# Patient Record
Sex: FEMALE | Race: WHITE | Employment: STUDENT | URBAN - METROPOLITAN AREA
[De-identification: names, ages, dates, MRNs, and addresses within clinical notes are randomized per-mention and may not be internally consistent; named-entity substitution may affect disease eponyms.]

---
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
## 2024-01-02 ENCOUNTER — OFFICE VISIT (OUTPATIENT)
Dept: INTERNAL MEDICINE CLINIC | Facility: CLINIC | Age: 19
End: 2024-01-02

## 2024-01-02 VITALS
SYSTOLIC BLOOD PRESSURE: 119 MMHG | WEIGHT: 129 LBS | HEART RATE: 100 BPM | OXYGEN SATURATION: 100 % | HEIGHT: 70 IN | BODY MASS INDEX: 18.47 KG/M2 | DIASTOLIC BLOOD PRESSURE: 71 MMHG | TEMPERATURE: 98 F

## 2024-01-02 DIAGNOSIS — J02.0 STREP THROAT: Primary | ICD-10-CM

## 2024-01-02 LAB
CONTROL LINE PRESENT WITH A CLEAR BACKGROUND (YES/NO): YES YES/NO
KIT LOT #: 7926 NUMERIC
STREP GRP A CUL-SCR: POSITIVE

## 2024-01-02 PROCEDURE — 3078F DIAST BP <80 MM HG: CPT | Performed by: INTERNAL MEDICINE

## 2024-01-02 PROCEDURE — 3008F BODY MASS INDEX DOCD: CPT | Performed by: INTERNAL MEDICINE

## 2024-01-02 PROCEDURE — 3074F SYST BP LT 130 MM HG: CPT | Performed by: INTERNAL MEDICINE

## 2024-01-02 PROCEDURE — 99213 OFFICE O/P EST LOW 20 MIN: CPT | Performed by: INTERNAL MEDICINE

## 2024-01-02 PROCEDURE — 87880 STREP A ASSAY W/OPTIC: CPT | Performed by: INTERNAL MEDICINE

## 2024-01-02 RX ORDER — AMOXICILLIN 500 MG/1
500 CAPSULE ORAL 3 TIMES DAILY
Qty: 30 CAPSULE | Refills: 0 | Status: SHIPPED | OUTPATIENT
Start: 2024-01-02 | End: 2024-01-12

## 2024-01-02 NOTE — PROGRESS NOTES
Subjective:     Patient ID: Allie Lopez is a 18 year old female.    Sore Throat         History/Other:   She came today complaining of sore throat.  According to her symptoms are ongoing for almost 5 days.  She has very painful swallowing.  She denies any fever or chills.  She denies any cough or congestion.  She denies any Knysak breath  Review of Systems   Constitutional: Negative.    HENT:  Positive for sore throat.    Respiratory: Negative.     Cardiovascular: Negative.    Gastrointestinal: Negative.    Genitourinary: Negative.    Musculoskeletal: Negative.    Neurological: Negative.    Hematological:  Negative for adenopathy.   Psychiatric/Behavioral: Negative.       No current outpatient medications on file.     Allergies:No Known Allergies    History reviewed. No pertinent past medical history.   History reviewed. No pertinent surgical history.   Family History   Problem Relation Age of Onset    Asthma Father     Hypertension Maternal Uncle     Lipids Maternal Uncle     Allergies Neg     Diabetes Neg     Cancer Neg     Heart Disorder Neg     Obesity Neg     Psychiatric Neg       Social History:   Social History     Socioeconomic History    Marital status: Single   Tobacco Use    Smoking status: Never    Smokeless tobacco: Never   Vaping Use    Vaping Use: Some days   Substance and Sexual Activity    Alcohol use: Yes     Comment: occ    Drug use: Never   Other Topics Concern    Second-hand smoke exposure No    Alcohol/drug concerns No    Violence concerns No   Social History Narrative    6th grade,tennis,        Objective:   Physical Exam  Vitals and nursing note reviewed.   Constitutional:       Appearance: Normal appearance.   HENT:      Head: Normocephalic and atraumatic.   Cardiovascular:      Rate and Rhythm: Normal rate and regular rhythm.      Pulses: Normal pulses.      Heart sounds: Normal heart sounds.   Pulmonary:      Effort: Pulmonary effort is normal.      Breath sounds: Normal breath  sounds.   Abdominal:      Palpations: Abdomen is soft.   Musculoskeletal:         General: Normal range of motion.      Cervical back: Normal range of motion and neck supple.   Neurological:      Mental Status: She is alert. Mental status is at baseline.         Assessment & Plan:   No diagnosis found.  Strep throat  I will start her on antibiotic, advised to take with food, salt gargles can help, Tylenol alternating with ibuprofen as needed for fever/pain  No orders of the defined types were placed in this encounter.      Meds This Visit:  Requested Prescriptions      No prescriptions requested or ordered in this encounter       Imaging & Referrals:  None

## 2024-01-09 NOTE — TELEPHONE ENCOUNTER
100 mL NS bolus given, pt laid flat, and UFR slowed to 423 to increase BP.   Mom contacted   Pt was exposed to [address], Monday 6/29 (a friend who drives her to Tennis)     Yesterday, pt with a \"dull\" headache.    Pt is sleeping at time of call    No fever  No cough   No sore throat   Eating/drinking fine     Mom was advised to geeta

## 2024-11-27 ENCOUNTER — OFFICE VISIT (OUTPATIENT)
Dept: INTERNAL MEDICINE CLINIC | Facility: CLINIC | Age: 19
End: 2024-11-27
Payer: COMMERCIAL

## 2024-11-27 ENCOUNTER — LAB ENCOUNTER (OUTPATIENT)
Dept: LAB | Age: 19
End: 2024-11-27
Payer: COMMERCIAL

## 2024-11-27 VITALS
HEART RATE: 98 BPM | WEIGHT: 123 LBS | HEIGHT: 70 IN | SYSTOLIC BLOOD PRESSURE: 110 MMHG | BODY MASS INDEX: 17.61 KG/M2 | DIASTOLIC BLOOD PRESSURE: 83 MMHG | OXYGEN SATURATION: 98 %

## 2024-11-27 DIAGNOSIS — Z00.00 ROUTINE GENERAL MEDICAL EXAMINATION AT A HEALTH CARE FACILITY: Primary | ICD-10-CM

## 2024-11-27 DIAGNOSIS — F17.200 VAPING NICOTINE DEPENDENCE, NON-TOBACCO PRODUCT: ICD-10-CM

## 2024-11-27 DIAGNOSIS — Z00.00 ROUTINE GENERAL MEDICAL EXAMINATION AT A HEALTH CARE FACILITY: ICD-10-CM

## 2024-11-27 LAB
CHOLEST SERPL-MCNC: 170 MG/DL (ref ?–200)
DEPRECATED RDW RBC AUTO: 40.6 FL (ref 35.1–46.3)
ERYTHROCYTE [DISTWIDTH] IN BLOOD BY AUTOMATED COUNT: 12 % (ref 11–15)
FASTING PATIENT LIPID ANSWER: YES
HCT VFR BLD AUTO: 46.7 %
HDLC SERPL-MCNC: 94 MG/DL (ref 40–59)
HGB BLD-MCNC: 15.2 G/DL
LDLC SERPL CALC-MCNC: 66 MG/DL (ref ?–100)
MCH RBC QN AUTO: 29.7 PG (ref 26–34)
MCHC RBC AUTO-ENTMCNC: 32.5 G/DL (ref 31–37)
MCV RBC AUTO: 91.2 FL
NONHDLC SERPL-MCNC: 76 MG/DL (ref ?–130)
PLATELET # BLD AUTO: 327 10(3)UL (ref 150–450)
RBC # BLD AUTO: 5.12 X10(6)UL
TRIGL SERPL-MCNC: 49 MG/DL (ref 30–149)
TSI SER-ACNC: 2.39 UIU/ML (ref 0.48–4.17)
VLDLC SERPL CALC-MCNC: 7 MG/DL (ref 0–30)
WBC # BLD AUTO: 8.5 X10(3) UL (ref 4–11)

## 2024-11-27 PROCEDURE — 85027 COMPLETE CBC AUTOMATED: CPT

## 2024-11-27 PROCEDURE — 84443 ASSAY THYROID STIM HORMONE: CPT

## 2024-11-27 PROCEDURE — 80061 LIPID PANEL: CPT

## 2024-11-27 PROCEDURE — 36415 COLL VENOUS BLD VENIPUNCTURE: CPT

## 2024-11-27 RX ORDER — SPIRONOLACTONE 50 MG/1
50 TABLET, FILM COATED ORAL DAILY
COMMUNITY

## 2024-11-27 NOTE — PROGRESS NOTES
Subjective:   Allie Lopez is a 18 year old female who presents for Physical     Presents for annual physical     PMHx  Acne  Hyperhidrosis     She feels well overall  Going to college   Sexually active with her boyfriend, uses condoms   Gets regular periods     Sometimes takes propranolol and oxybutynin for her hyperhidrosis   Legs get mottled at times and she will take the propranolol if she has an event   Spironolactone for acne     Has been vaping for about 3-4 years and feels like it is affecting her health   Got strep throat 5 times this year and also had mono  Feels she is coughing up phlegm all the time   Denies sharing vape pens     Has tried to quit in the past and the longest she was able to quit was 8 days and still had cravings   Has tried nicotine gum, burns her mouth    Has also tried nicotine patches which helped a little, however she has eczema and hyperhidrosis so they would irritate or slide off her skin  Tried cutting cold turkey   Tried candy, water   Has trouble focusing to do school work without vaping  Vapes daily, during homework, or while drinking alcohol, or while around her friends who vape  When she is around her boyfriend she does not vape   Cravings are currently lower as she is home now   She wants to quit, is inquiring about medication therapy other than nicotine patches  Has never tried behavioral therapy for quitting    Rarely smokes marijuana   Drinks alcohol about once a week at school, maybe 2-3 drinks in a sitting     History/Other:    Chief Complaint Reviewed and Verified  No Further Nursing Notes to   Review  Tobacco Reviewed  Allergies Reviewed  Medications Reviewed  OB   Status Reviewed         Tobacco:  Social History     Tobacco Use   Smoking Status Never   Smokeless Tobacco Never     E-Cigarettes/Vaping       Questions Responses    E-Cigarette Use Current Some Day User          E-Cigarette/Vaping Substances       Questions Responses    Nicotine No    THC  No    CBD No    Flavoring No          E-Cigarette/Vaping Devices       Questions Responses    Disposable No    Pre-filled or Refillable Cartridge No    Refillable Tank No    Pre-filled Pod No           Tobacco cessation counseling for <3 minutes.      Current Outpatient Medications   Medication Sig Dispense Refill    spironolactone 50 MG Oral Tab Take 1 tablet (50 mg total) by mouth daily.       Review of Systems:  Review of Systems  10 point review of systems otherwise negative with the exception of HPI and assessment and plan    Objective:   /83   Pulse 98   Ht 5' 10\" (1.778 m)   Wt 123 lb (55.8 kg)   LMP 11/25/2024 (Approximate)   SpO2 98%   BMI 17.65 kg/m²  Estimated body mass index is 17.65 kg/m² as calculated from the following:    Height as of this encounter: 5' 10\" (1.778 m).    Weight as of this encounter: 123 lb (55.8 kg).  Physical Exam  Vitals reviewed.   Constitutional:       General: She is not in acute distress.     Appearance: Normal appearance. She is well-developed.   HENT:      Right Ear: Tympanic membrane normal.      Left Ear: Tympanic membrane normal.      Mouth/Throat:      Mouth: Mucous membranes are moist.      Pharynx: Oropharynx is clear.   Cardiovascular:      Rate and Rhythm: Normal rate and regular rhythm.      Heart sounds: Normal heart sounds.   Pulmonary:      Effort: Pulmonary effort is normal.      Breath sounds: Normal breath sounds.   Abdominal:      General: Bowel sounds are normal.      Palpations: Abdomen is soft.   Lymphadenopathy:      Cervical: No cervical adenopathy.   Skin:     General: Skin is warm and dry.   Neurological:      Mental Status: She is alert and oriented to person, place, and time.           Assessment & Plan:   1. Routine general medical examination at a health care facility (Primary)  -     CBC, Platelet; No Differential; Future; Expected date: 11/27/2024  -     TSH W Reflex To Free T4; Future; Expected date: 11/27/2024  -     Lipid Panel;  Future; Expected date: 11/27/2024  2. Vaping nicotine dependence, non-tobacco product  -     Behavioral Health Consultation  Discussed I would not order other medication therapies given her age and uncertain efficacy in this age group as well as concern for side effects   Will refer to behavioral therapy and psychiatry for assistance with quitting  Also provided information on free assistance programs   Encouraged to try to find a psychiatrist near her school through the student health clinic but also referred here - she will try to see them during Alpha break     ELHAM Lau, 11/27/2024, 9:32 AM

## 2024-11-27 NOTE — PATIENT INSTRUCTIONS
Resources to quit vaping:  [id number]   Smokefree Teen    Psychiatry - recommend you see a psychiatrist by your school  Talk to the health clinic to get a recommendation    Soft

## (undated) NOTE — LETTER
Name:  Sheela Almendarez Year:  Class: Student ID No.:   Address:  [address] [place] [address] Phone:  [phone] (home) [phone] (work) :  14year old   Name Relationship Lgl [address] Work Phone Home Phone Mobile Phone   1.  ST syndrome, short QT syndrome, Brugada syndrome, or catecholaminergic polymorphic ventricular tachycardia? 13. Does anyone in your family have a heart problem, pacemaker, or implanted defibrillator?      12. Has anyone in your family had unexplained faint 37. Do you have headaches with exercise? 38. Have you ever had numbness, tingling, or weakness in your arms or legs after being hit or falling? 39.Have you ever been unable to move your arms / legs after being hit /fall? 40.  Have you ever becom excavatum,      arachnodactyly, arm span > height, hyperlaxity, myopia, MVP, aortic insufficiency) Yes    Eyes/Ears/Nose/Throat:  Pupils equal    Hearing Yes    Lymph nodes Yes    Heart*  · Murmurs (auscultation standing, supine, +/- Valsalva)  · Location that I/our student will not use performance-enhancing substances as defined in the [facility] Performance-Enhancing Substance Testing Program Protocol.  We have reviewed the policy and understand that I/our student may be asked to submit to testing for the presen

## (undated) NOTE — LETTER
[place] [address] [address] of Child Health Examination       Student's Name  Bertha Soler Birth Date Title                           Date  01/22/18   Signature HEALTH HISTORY          TO BE COMPLETED AND SIGNED BY PARENT/GUARDIAN AND VERIFIED BY HEALTH CARE PROVIDER    ALLERGIES  (Food, drug, insect, other) MEDICATION  (List all prescribed or taken on a regular basis.)     Diagnosis of asthma?   Child wakes during DIABETES SCREENING  BMI>85% age/sex  No And any two of the following:  Family History No   Ethnic Minority  No          Signs of Insulin Resistance (hypertension, dyslipidemia, polycystic ovarian syndrome, acanthosis nigricans)    No           At Risk  No Quick-relief  medication (e.g. Short Acting Beta Antagonist): No          Controller medication (e.g. inhaled corticosteroid):   No Other   NEEDS/MODIFICATIONS required in the school setting  None DIETARY Needs/Restrictions     None   SPECIAL INSTR

## (undated) NOTE — LETTER
VACCINE ADMINISTRATION RECORD  PARENT / GUARDIAN APPROVAL  Date: 2019  Vaccine administered to: Cheyenne Vanegas     : 2005    MRN: [id number]    A copy of the appropriate Centers for Disease Control and Prevention Vaccine Information

## (undated) NOTE — LETTER
6/29/2018              Kelvin Capellan [address] [place] [address] [place] [address]       TO WHOM IT MAY CONCERN,      Rossi Chavez is a patient of mine. Her YOB: 2005.       Sincerely,                Leelee Noel

## (undated) NOTE — ED AVS SNAPSHOT
Parent/Legal Guardian Access to the Online [employer] Record of a Patient 15to 16Years Old  Return completed form by Secure email to [place] HIM/Medical Records Department: [email or web address].     Requirements and Procedures   Under [facility] MyChart ID and password with another person, that person may be able to view my or my child’s health information, and health information about someone who has authorized me as a MyChart proxy.    ·  I agree that it is my responsibility to select a confident Sign-Up Form and I agree to its terms.        Authorization Form     Please enter Patient’s information below:   Name (last, first, middle initial) __________________________________________   Gender  Male  Female    Last 4 Digits of Social Security Number Parent/Legal Guardian Signature                                  For Patient (1517 years of age)  I agree to allow my parent/legal guardian, named above, online access to my medical information currently available and that may become available as a result

## (undated) NOTE — LETTER
VACCINE ADMINISTRATION RECORD  PARENT / GUARDIAN APPROVAL  Date: 2021  Vaccine administered to: Breezy Lala     : 2005    MRN: [id number]    A copy of the appropriate Centers for Disease Control and Prevention Vaccine Information s

## (undated) NOTE — LETTER
Name:  Esthela Milligan Year:  7th Grade Class: Student ID No.:   Address:  [address] [place] [address] Phone:  [phone] (home) [phone] (work) :  14year old   Name Relationship Lgl [address] Work [employer] 12. Has anyone in your family had unexplained fainting, seizures, or near drowning? BONE AND JOINT QUESTIONS Yes No   17. Have you ever had an injury to a bone, muscle, ligament, or tendon that caused you to miss a practice or a game?      18. Have you /fall?     36. Have you ever become ill while exercising in the heat?     41. Do you get frequent muscle cramps when exercising? 42. Do you or someone in your family have sickle cell trait or disease? 43.  Have you ever had any problems with your ey Genitourinary (males only)* N/A    Skin:  HSV, lesions suggestive of MRSA, tinea corporis Yes    Neurologic* Yes    MUSCULOSKELETAL     Neck Yes    Back Yes    Shoulder/arm Yes    Elbow/forearm Yes    Wrist/hand/fingers Yes    Hip/thigh Yes    Knee Yes state series events or during the school day, and I/our student do/does hereby agree to submit to such testing and analysis by a certified laboratory.  We further understand and agree that the results of the performance-enhancing substance testing may be pr

## (undated) NOTE — LETTER
22          Jose Moreira  :  2005      To Whom It May Concern: This patient was seen in our office on 22. Please remain off of sports for the next 7 days. If this office may be of further assistance, please do not hesitate to contact us.       Sincerely,        Kye Thomas, DO

## (undated) NOTE — MR AVS SNAPSHOT
[address] [id number] [phone]               Thank you for choosing us for your health care visit with Andie Vernon MD.  We are glad to serve you and happy to provide you with this summar MyChart     Sign up for [employer] access for your child. [employer] access allows you to view health information for your child from their recent   visit, view other health information and more.   To sign up or find more information on getting   Proxy Acc In addition to 5, 4, 3, 2, 1 families can make small changes in their family routines to help everyone lead healthier active lives.  Try:  o Eating breakfast everyday  o Eating low-fat dairy products like yogurt, milk, and cheese  o Regularly eating meals t

## (undated) NOTE — LETTER
[facility] [employer] [facility] [employer] of Child Health Examination       Student's Name  [address] [place] [address], [address] [facility] [address] [address] [facility] [address] Title    APRN          Date  7/26/2021   Signature                                                                                                                                              Title                           Date    (If adding CARE PROVIDER    ALLERGIES  (Food, drug, insect, other)  Patient has no known allergies. MEDICATION  (List all prescribed or taken on a regular basis.)     Diagnosis of asthma?   Child wakes during the night coughing   Yes   No    Yes   No    Loss of functi Family History No    Ethnic Minority  No          Signs of Insulin Resistance (hypertension, dyslipidemia, polycystic ovarian syndrome, acanthosis nigricans)    No           At Risk  No   Lead Risk Questionnaire  Req'd for children 6 months thru 6 yrs alejandroro corticosteroid):   No Other   NEEDS/MODIFICATIONS required in the school setting  None DIETARY Needs/Restrictions     None   SPECIAL INSTRUCTIONS/DEVICES e.g. safety glasses, glass eye, chest protector for arrhythmia, pacemaker, prosthetic device, dental b

## (undated) NOTE — LETTER
[facility] [employer] [facility] [employer] of Child Health Examination       Student's Name  [address] [place] [address], [address] [facility] [address] [address] Signature                                                                                                                                    Title      MD                     Date  5/22/2019    Signature 12/21/2005  Sex  Female School   Grade Level/ID#  9th Grade   HEALTH HISTORY          TO BE COMPLETED AND SIGNED BY PARENT/GUARDIAN AND VERIFIED BY HEALTH CARE PROVIDER    ALLERGIES  (Food, drug, insect, other)  Patient has no known allergies.  MEDICATION appropriate personnel for health /educational purposes. Bone/Joint problem/injury/scoliosis?    Yes   No  Parent/Guardian Signature                                          Date     PHYSICAL EXAMINATION REQUIREMENTS    Entire section below to be completed SYSTEM REVIEW Normal Comments/Follow-up/Needs  Normal Comments/Follow-up/Needs   Skin Yes  Endocrine Yes    Ears Yes                      Screen result: Gastrointestinal Yes    Eyes Yes     Screen result:   Genito-Urinary Yes  LMP   Nose Yes  Neurological [address] [facility]  [phone]   Rev 11/15                                                                    Printed by the [employer]

## (undated) NOTE — LETTER
[facility] [employer] [facility] [employer] of Child Health Examination       Student's Name  [address] [place] [address], [address] [facility] [address] Title      MD                     Date  7/23/2020   Signature                                                                                                                                              Title                           Date VERIFIED BY HEALTH CARE PROVIDER    ALLERGIES  (Food, drug, insect, other)  Patient has no known allergies. MEDICATION  (List all prescribed or taken on a regular basis.)     Diagnosis of asthma?   Child wakes during the night coughing   Yes   No    Yes   N DIABETES SCREENING  BMI>85% age/sex  No And any two of the following:  Family History No    Ethnic Minority  No          Signs of Insulin Resistance (hypertension, dyslipidemia, polycystic ovarian syndrome, acanthosis nigricans)    No           At Risk  No Quick-relief  medication (e.g. Short Acting Beta Antagonist): No          Controller medication (e.g. inhaled corticosteroid):   No Other   NEEDS/MODIFICATIONS required in the school setting  None DIETARY Needs/Restrictions     None   SPECIAL INSTR

## (undated) NOTE — LETTER
[address], [facility]   Date:   9/13/2022     Name:   Ynes Griffin    YOB: 2005   MRN:   [id number]       [facility]? Bro the areas on your body where you feel the described sensations. Use the appropriate symbol. Breezy Smith the areas of radiation. Include all affected areas. Just to complete the picture, please draw in the face. ACHE:  ^ ^ ^   NUMBNESS:  0000   PINS & NEEDLES:  = = = =                              ^ ^ ^                       0000              = = = =                                    ^ ^ ^                       0000            = = = =      BURNING:  XXXX   STABBING: ////                  XXXX                ////                         XXXX          ////     Please bro the line below indicating your degree of pain right now  with 0 being no pain 10 being the worst pain possible.                                          0             1             2              3             4              5              6              7             8             9             10         Patient Signature:

## (undated) NOTE — LETTER
VACCINE ADMINISTRATION RECORD  PARENT / GUARDIAN APPROVAL  Date: 2020  Vaccine administered to: Bonnie Faustin     : 2005    MRN: [id number]    A copy of the appropriate Centers for Disease Control and Prevention Vaccine Information s

## (undated) NOTE — LETTER
Name:  Leander Drew Year:   Class: Student ID No.:   Address:  [address] [place] [address] Phone:  [phone] (home)  :  11year old   Name Relationship Lgl [address] Work Phone Home Phone Mobile Phone   1.  EYAD XIAO near drowning? BONE AND JOINT QUESTIONS Yes No   17. Have you ever had an injury to a bone, muscle, ligament, or tendon that caused you to miss a practice or a game? 18. Have you ever had any broken bones or dislocated joints?      19. Have you ever 41. Do you get frequent muscle cramps when exercising? 42. Do you or someone in your family have sickle cell trait or disease? 43. Have you ever had any problems with your eyes or vision? 44. Have you had any eye injuries? 45.  Do you wear of MRSA, tinea corporis Yes    Neurologic* Yes    MUSCULOSKELETAL     Neck Yes    Back Yes    Shoulder/arm Yes    Elbow/forearm Yes    Wrist/hand/fingers Yes    Hip/thigh Yes    Knee Yes    Leg/ankle Yes    Foot/toes Yes    Functional:  Duck-walk, single l submit to such testing and analysis by a certified laboratory.  We further understand and agree that the results of the performance-enhancing substance testing may be provided to certain individuals in my/our student’s high school as specified in the [address]

## (undated) NOTE — LETTER
Name:  Sheela Almendarez Year:   Class: Student ID No.:   Address:  [address] [place] [address] Phone:  [phone] (home)  :  12year old   Name Relationship Lgl [address] Work Phone Home Phone Mobile Phone   1.  EYAD XIAO 12. Has anyone in your family had unexplained fainting, seizures, or near drowning? BONE AND JOINT QUESTIONS Yes No   17. Have you ever had an injury to a bone, muscle, ligament, or tendon that caused you to miss a practice or a game?      18. Have you /fall?     36. Have you ever become ill while exercising in the heat?     41. Do you get frequent muscle cramps when exercising? 42. Do you or someone in your family have sickle cell trait or disease? 43.  Have you ever had any problems with your ey Lungs Yes    Abdomen Yes    Genitourinary (males only)* N/A    Skin:  HSV, lesions suggestive of MRSA, tinea corporis Yes    Neurologic* Yes    MUSCULOSKELETAL     Neck Yes    Back Yes    Shoulder/arm Yes    Elbow/forearm Yes    Wrist/hand/fingers Yes    H performance-enhancing substances in my/his/her body either during [employer] state series events or during the school day, and I/our student do/does hereby agree to submit to such testing and analysis by a certified laboratory.  We further understand and agree th